# Patient Record
Sex: MALE | Race: BLACK OR AFRICAN AMERICAN | NOT HISPANIC OR LATINO
[De-identification: names, ages, dates, MRNs, and addresses within clinical notes are randomized per-mention and may not be internally consistent; named-entity substitution may affect disease eponyms.]

---

## 2021-02-10 PROBLEM — Z00.00 ENCOUNTER FOR PREVENTIVE HEALTH EXAMINATION: Status: ACTIVE | Noted: 2021-02-10

## 2021-03-05 ENCOUNTER — APPOINTMENT (OUTPATIENT)
Dept: OTOLARYNGOLOGY | Facility: CLINIC | Age: 51
End: 2021-03-05
Payer: COMMERCIAL

## 2021-03-05 VITALS
DIASTOLIC BLOOD PRESSURE: 85 MMHG | BODY MASS INDEX: 31.1 KG/M2 | OXYGEN SATURATION: 98 % | SYSTOLIC BLOOD PRESSURE: 156 MMHG | HEART RATE: 79 BPM | WEIGHT: 210 LBS | TEMPERATURE: 97.3 F | HEIGHT: 69 IN

## 2021-03-05 DIAGNOSIS — J34.2 DEVIATED NASAL SEPTUM: ICD-10-CM

## 2021-03-05 DIAGNOSIS — R09.81 NASAL CONGESTION: ICD-10-CM

## 2021-03-05 DIAGNOSIS — H60.8X3 OTHER OTITIS EXTERNA, BILATERAL: ICD-10-CM

## 2021-03-05 DIAGNOSIS — L29.9 PRURITUS, UNSPECIFIED: ICD-10-CM

## 2021-03-05 DIAGNOSIS — M26.629 ARTHRALGIA OF TEMPOROMANDIBULAR JOINT,: ICD-10-CM

## 2021-03-05 DIAGNOSIS — J30.9 ALLERGIC RHINITIS, UNSPECIFIED: ICD-10-CM

## 2021-03-05 DIAGNOSIS — H69.83 OTHER SPECIFIED DISORDERS OF EUSTACHIAN TUBE, BILATERAL: ICD-10-CM

## 2021-03-05 PROCEDURE — 99072 ADDL SUPL MATRL&STAF TM PHE: CPT

## 2021-03-05 PROCEDURE — 31231 NASAL ENDOSCOPY DX: CPT

## 2021-03-05 PROCEDURE — 99204 OFFICE O/P NEW MOD 45 MIN: CPT | Mod: 25

## 2021-03-05 RX ORDER — MONTELUKAST 10 MG/1
10 TABLET, FILM COATED ORAL DAILY
Qty: 30 | Refills: 3 | Status: ACTIVE | COMMUNITY
Start: 2021-03-05 | End: 1900-01-01

## 2021-03-05 RX ORDER — MOMETASONE FUROATE 1 MG/G
0.1 CREAM TOPICAL TWICE DAILY
Qty: 1 | Refills: 3 | Status: ACTIVE | COMMUNITY
Start: 2021-03-05 | End: 1900-01-01

## 2021-03-05 NOTE — ASSESSMENT
[FreeTextEntry1] : 51M who presents wt nasal congestion and ear tingling. on exam, he is found to have a scar overlying he LET ostium, but otherwise a leftward dev septum and allergic nasal mucosa. Also noted TMJ with mouth opening. All three factors could be contributing to some eustachian tube dysfunction and chronic otitis.\par \par Plan:\par - Rx Singulair\par - continue nasonex\par - Rx Mometasone cream\par - f/u with dentist and soft foods/NSAIDs in the meantime\par

## 2021-03-05 NOTE — PROCEDURE
[Recalcitrant Symptoms] : recalcitrant symptoms  [None] : none [Flexible Endoscope] : examined with the flexible endoscope [Congested] : congested [Deviated to the Lt] : deviated to the left [Normal] : the paranasal sinuses had no abnormalities [FreeTextEntry6] : scarring over the left ET opening

## 2021-03-05 NOTE — PHYSICAL EXAM
[Nasal Endoscopy Performed] : nasal endoscopy was performed, see procedure section for findings [Normal] : mucosa is normal [Midline] : trachea located in midline position [de-identified] : + L jaw spasm with wide mouth opening [de-identified] : edema

## 2021-03-05 NOTE — HISTORY OF PRESENT ILLNESS
[de-identified] : 51M who presents with bilateral ear tingling sensation x 2-3 weeks. He repots that in the last few weeks he has had ear"tingling" sensations that are improved with putting Qtips or tissues in his ears. He denies any hearing loss, tinnitus, otalgia, otorrhea,vertigo. He also admits to nasal congestion intermittently over the last 2-3 months which his PCP prescribed nasocort for which in combination with a humidifier helps his symptoms. He denies any purulent nasal drainage, facial pain, anosmia. No other ENT complaints. No pertinent FH/SH.